# Patient Record
Sex: MALE | Race: WHITE | NOT HISPANIC OR LATINO | URBAN - METROPOLITAN AREA
[De-identification: names, ages, dates, MRNs, and addresses within clinical notes are randomized per-mention and may not be internally consistent; named-entity substitution may affect disease eponyms.]

---

## 2020-05-21 ENCOUNTER — APPOINTMENT (RX ONLY)
Dept: URBAN - METROPOLITAN AREA OTHER 11 | Facility: OTHER | Age: 55
Setting detail: DERMATOLOGY
End: 2020-05-21

## 2020-05-21 PROBLEM — D23.71 OTHER BENIGN NEOPLASM OF SKIN OF RIGHT LOWER LIMB, INCLUDING HIP: Status: ACTIVE | Noted: 2020-05-21

## 2020-05-21 PROCEDURE — ? COUNSELING

## 2020-05-21 PROCEDURE — 99242 OFF/OP CONSLTJ NEW/EST SF 20: CPT

## 2020-05-21 NOTE — HPI: DERMATOLOGY CONSULTATION
How Severe Is Your Condition? (The Patient Describes The Severity Level As....): moderate
What Is The Consultation For? (Seen In Consultation For...): skin lesion
Which Provider Consulted Us?: Dusty Clay MD
When Did The Patient First Notice This? (The Patient First Noticed It...): years ago
Where On Your Body Is It? (Located On The...): right lower leg
Any Associated Symptoms? (The Symptoms Include.....): recently bleed
How Did This Start? (The Symptoms Started...): without any known trauma to the area
What Happened Since That Time? (Since That Time...): the area has not changed, it has stayed the same
What Previous Treatments Have Been Tried? (The Patient Has Tried The Following Treatments...): no previous treatment to the area